# Patient Record
Sex: FEMALE | Race: WHITE | NOT HISPANIC OR LATINO | URBAN - NONMETROPOLITAN AREA
[De-identification: names, ages, dates, MRNs, and addresses within clinical notes are randomized per-mention and may not be internally consistent; named-entity substitution may affect disease eponyms.]

---

## 2019-09-16 ENCOUNTER — APPOINTMENT (RX ONLY)
Dept: URBAN - NONMETROPOLITAN AREA CLINIC 13 | Facility: CLINIC | Age: 79
Setting detail: DERMATOLOGY
End: 2019-09-16

## 2019-09-16 DIAGNOSIS — Z41.9 ENCOUNTER FOR PROCEDURE FOR PURPOSES OTHER THAN REMEDYING HEALTH STATE, UNSPECIFIED: ICD-10-CM

## 2019-09-16 PROCEDURE — ? ADDITIONAL NOTES

## 2019-09-16 PROCEDURE — ? FILLERS

## 2019-09-16 NOTE — PROCEDURE: ADDITIONAL NOTES
Additional Notes: patient currently living in [place].  would like to have Infini done for skin tightening.  may schedule this for the near future.
Detail Level: Simple

## 2019-09-16 NOTE — PROCEDURE: FILLERS
Additional Area 2 Volume In Cc: 0
Include Cannula Information In Note?: No
Lot #: [id number]
Detail Level: Detailed
Expiration Date (Month Year): 06/2020
Consent: Written consent obtained. Risks include but not limited to bruising, beading, irregular texture, ulceration, infection, allergic reaction, scar formation, incomplete augmentation, temporary nature, procedural pain.
Post-Care Instructions: Patient instructed to apply ice to reduce swelling.
Topical Anesthesia?: 23% lidocaine, 7% tetracaine
Price (Use Numbers Only, No Special Characters Or $): 900.00
Filler: Juvederm Voluma XC
Lot #: [id number]
Map Statment: See Attach Map for Complete Details
Expiration Date (Month Year): 12/2019
Cheeks Filler Volume In Cc: 1
Lot #: [id number]
Expiration Date (Month Year): 06/2019
Lot #: [id number]
Expiration Date (Month Year): 10/2020